# Patient Record
Sex: FEMALE | Race: WHITE | NOT HISPANIC OR LATINO
[De-identification: names, ages, dates, MRNs, and addresses within clinical notes are randomized per-mention and may not be internally consistent; named-entity substitution may affect disease eponyms.]

---

## 2018-12-26 ENCOUNTER — TRANSCRIPTION ENCOUNTER (OUTPATIENT)
Age: 23
End: 2018-12-26

## 2022-05-30 ENCOUNTER — NON-APPOINTMENT (OUTPATIENT)
Age: 27
End: 2022-05-30

## 2022-06-03 ENCOUNTER — NON-APPOINTMENT (OUTPATIENT)
Age: 27
End: 2022-06-03

## 2022-06-28 ENCOUNTER — NON-APPOINTMENT (OUTPATIENT)
Age: 27
End: 2022-06-28

## 2022-07-24 ENCOUNTER — NON-APPOINTMENT (OUTPATIENT)
Age: 27
End: 2022-07-24

## 2022-08-26 ENCOUNTER — NON-APPOINTMENT (OUTPATIENT)
Age: 27
End: 2022-08-26

## 2023-08-07 PROBLEM — Z00.00 ENCOUNTER FOR PREVENTIVE HEALTH EXAMINATION: Status: ACTIVE | Noted: 2023-08-07

## 2023-08-08 ENCOUNTER — APPOINTMENT (OUTPATIENT)
Dept: MRI IMAGING | Facility: CLINIC | Age: 28
End: 2023-08-08
Payer: COMMERCIAL

## 2023-08-08 ENCOUNTER — APPOINTMENT (OUTPATIENT)
Dept: ORTHOPEDIC SURGERY | Facility: CLINIC | Age: 28
End: 2023-08-08
Payer: COMMERCIAL

## 2023-08-08 VITALS — HEIGHT: 65 IN | WEIGHT: 110 LBS | BODY MASS INDEX: 18.33 KG/M2

## 2023-08-08 PROCEDURE — 99204 OFFICE O/P NEW MOD 45 MIN: CPT

## 2023-08-08 PROCEDURE — 73221 MRI JOINT UPR EXTREM W/O DYE: CPT | Mod: RT

## 2023-08-08 PROCEDURE — 73110 X-RAY EXAM OF WRIST: CPT | Mod: RT

## 2023-08-08 NOTE — ASSESSMENT
[FreeTextEntry1] : Has acute SL tear.  Needs STAT MRI, and will see Dr. Dias for consultation after MRI

## 2023-08-08 NOTE — IMAGING
[Right] : right wrist [de-identified] : right wrist- no swelling, +ttp dorsally over druj, pain with range of motion which is limited nvid [FreeTextEntry8] : scapholunate widening on deviated views

## 2023-08-08 NOTE — HISTORY OF PRESENT ILLNESS
[Dull/Aching] : dull/aching [Shooting] : shooting [Bending forward] : bending forward [Extending back] : extending back [de-identified] : 8-8-23- right hand dominant female had fall backwards on right wrist ten days ago. pain dorsally drug region. Limited range of motion she has been wearing a brace.  works as  a teacher assist  [] : no [FreeTextEntry1] : RT wrist [FreeTextEntry5] : 27yr old female fell on 07/31/2023 while playing soccer. Mentions surgery RT ring finger. [de-identified] : twisting

## 2023-08-09 ENCOUNTER — APPOINTMENT (OUTPATIENT)
Dept: ORTHOPEDIC SURGERY | Facility: CLINIC | Age: 28
End: 2023-08-09
Payer: COMMERCIAL

## 2023-08-09 VITALS — BODY MASS INDEX: 18.33 KG/M2 | HEIGHT: 65 IN | WEIGHT: 110 LBS

## 2023-08-09 DIAGNOSIS — S63.501A UNSPECIFIED SPRAIN OF RIGHT WRIST, INITIAL ENCOUNTER: ICD-10-CM

## 2023-08-09 PROCEDURE — L3908: CPT

## 2023-08-09 PROCEDURE — 99214 OFFICE O/P EST MOD 30 MIN: CPT | Mod: 25

## 2023-08-09 NOTE — ASSESSMENT
[FreeTextEntry1] : The patient was advised of the diagnosis. The natural history of the pathology was explained in full to the patient in layman's terms. All questions were answered. The risks and benefits of surgical and non-surgical treatment alternatives were explained in full to the patient.  NSAIDs recommended.  Patient warned of risk of NSAID medication to stomach and GI tract, risk of increase blood pressure, cardiac risk, and risk of fluid retention.  The patient should clear taking medication with internist/PMD if any problem with heart, blood pressure, or GI system exists.  C/w wrist brace full time NWB right wrist F/u in 4 weeks

## 2023-08-09 NOTE — HISTORY OF PRESENT ILLNESS
[Dull/Aching] : dull/aching [Shooting] : shooting [Bending forward] : bending forward [Extending back] : extending back [de-identified] : 8/9/23:  Pt fell playing soccer on 7/29/23 and hurt her right wrist.  Pt has been wearing a wrist brace with some relief.  Pt was seen by Dr Obrien and is here s/p MRI.  MRI right wrist: 1. contusion of the distal radial margin of the radius 2. Ulnar TFC fraying 3. 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 Chronic tear of subsheath. No tenosynovitis.  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 Mentions surgery RT ring finger. [de-identified] : twisting

## 2023-08-09 NOTE — DATA REVIEWED
[MRI] : MRI [Right] : of the right [Wrist] : wrist [I independently reviewed and interpreted images and report] : I independently reviewed and interpreted images and report [FreeTextEntry1] : tusion of the distal radial margin of the radius 2. Ulnar TFC fraying 3. 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 Chronic tear of subsheath. No tenosynovitis.

## 2023-08-09 NOTE — IMAGING
[Right] : right wrist [de-identified] : right wrist: no swelling/ecchymosis TFCC and ECU ttp ttp over SL rom not assessed due to guarding nvid [FreeTextEntry8] : scapholunate widening on deviated views

## 2023-09-06 ENCOUNTER — APPOINTMENT (OUTPATIENT)
Dept: ORTHOPEDIC SURGERY | Facility: CLINIC | Age: 28
End: 2023-09-06
Payer: COMMERCIAL

## 2023-09-06 VITALS — HEIGHT: 65 IN | BODY MASS INDEX: 18.33 KG/M2 | WEIGHT: 110 LBS

## 2023-09-06 DIAGNOSIS — S63.8X1A SPRAIN OF OTHER PART OF RIGHT WRIST AND HAND, INITIAL ENCOUNTER: ICD-10-CM

## 2023-09-06 DIAGNOSIS — S69.91XA UNSPECIFIED INJURY OF RIGHT WRIST, HAND AND FINGER(S), INITIAL ENCOUNTER: ICD-10-CM

## 2023-09-06 PROCEDURE — 99213 OFFICE O/P EST LOW 20 MIN: CPT

## 2023-09-06 NOTE — IMAGING
[Right] : right wrist [de-identified] : right wrist: no swelling/ecchymosis no TFCC and ECU ttp ttp over SL dorsally especially woth wrist flexion rom --stiff nvid [FreeTextEntry8] : scapholunate widening on deviated views

## 2023-09-06 NOTE — ASSESSMENT
[FreeTextEntry1] : The patient was advised of the diagnosis. The natural history of the pathology was explained in full to the patient in layman's terms. All questions were answered. The risks and benefits of surgical and non-surgical treatment alternatives were explained in full to the patient.  NSAIDs recommended.  Patient warned of risk of NSAID medication to stomach and GI tract, risk of increase blood pressure, cardiac risk, and risk of fluid retention.  The patient should clear taking medication with internist/PMD if any problem with heart, blood pressure, or GI system exists.  recommedn hand therapy for SL pathology- despite negative imaging, pain is entirely from that area   brace with activity  f/u 6w

## 2023-09-06 NOTE — HISTORY OF PRESENT ILLNESS
[Dull/Aching] : dull/aching [Shooting] : shooting [Bending forward] : bending forward [Extending back] : extending back [de-identified] : 9/6/23: much improved in brace, but pain with twisting a cap.   8/9/23:  Pt fell playing soccer on 7/29/23 and hurt her right wrist.  Pt has been wearing a wrist brace with some relief.  Pt was seen by Dr Obrien and is here s/p MRI.  MRI right wrist: 1. contusion of the distal radial margin of the radius 2. Ulnar TFC fraying 3. 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 Chronic tear of subsheath. No tenosynovitis.  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 Mentions surgery RT ring finger. [de-identified] : twisting

## 2023-10-18 ENCOUNTER — APPOINTMENT (OUTPATIENT)
Dept: ORTHOPEDIC SURGERY | Facility: CLINIC | Age: 28
End: 2023-10-18

## 2024-03-18 ENCOUNTER — APPOINTMENT (OUTPATIENT)
Dept: MATERNAL FETAL MEDICINE | Facility: CLINIC | Age: 29
End: 2024-03-18
Payer: COMMERCIAL

## 2024-03-18 ENCOUNTER — ASOB RESULT (OUTPATIENT)
Age: 29
End: 2024-03-18

## 2024-03-18 ENCOUNTER — NON-APPOINTMENT (OUTPATIENT)
Age: 29
End: 2024-03-18

## 2024-03-18 ENCOUNTER — APPOINTMENT (OUTPATIENT)
Dept: ANTEPARTUM | Facility: CLINIC | Age: 29
End: 2024-03-18
Payer: COMMERCIAL

## 2024-03-18 PROCEDURE — 36415 COLL VENOUS BLD VENIPUNCTURE: CPT

## 2024-03-18 PROCEDURE — 76813 OB US NUCHAL MEAS 1 GEST: CPT

## 2024-03-18 PROCEDURE — 76801 OB US < 14 WKS SINGLE FETUS: CPT

## 2024-03-18 PROCEDURE — 99204 OFFICE O/P NEW MOD 45 MIN: CPT | Mod: 25

## 2024-04-10 ENCOUNTER — APPOINTMENT (OUTPATIENT)
Dept: ANTEPARTUM | Facility: CLINIC | Age: 29
End: 2024-04-10
Payer: COMMERCIAL

## 2024-04-10 ENCOUNTER — ASOB RESULT (OUTPATIENT)
Age: 29
End: 2024-04-10

## 2024-04-10 PROCEDURE — 99213 OFFICE O/P EST LOW 20 MIN: CPT | Mod: 25

## 2024-04-10 PROCEDURE — 76815 OB US LIMITED FETUS(S): CPT

## 2024-04-15 ENCOUNTER — NON-APPOINTMENT (OUTPATIENT)
Age: 29
End: 2024-04-15

## 2024-04-17 ENCOUNTER — APPOINTMENT (OUTPATIENT)
Dept: ANTEPARTUM | Facility: CLINIC | Age: 29
End: 2024-04-17